# Patient Record
Sex: MALE | Race: AMERICAN INDIAN OR ALASKA NATIVE
[De-identification: names, ages, dates, MRNs, and addresses within clinical notes are randomized per-mention and may not be internally consistent; named-entity substitution may affect disease eponyms.]

---

## 2017-09-20 NOTE — CAT SCAN REPORT
FINAL REPORT



PROCEDURE:  CT CERVICAL SPINE WO CON



TECHNIQUE:  Computerized tomography of the cervical spine was

performed from the skull base to T1 without contrast material. 



HISTORY:  c-spine tenderness 



COMPARISON:  No prior studies are available for comparison.



FINDINGS:  

No fracture or subluxation is seen. The prevertebral soft tissues

appear normal. Posterior elements are intact. Posterior

osteophytic spurring is visualized at C3-4 level. This overlies a

diffuse disc bulge obscuring a portion the anterior epidural

space without definite cord compression or spinal stenosis. There

is minimal posterior osteophytic spurring also seen at C6-C7 to

the left of midline without cord compression or spinal stenosis. 



Calcifications are visualized in the left carotid artery

indicating atherosclerosis. 



No other abnormalities are identified. 



IMPRESSION:  

Mild degenerative disc disease as described. If further

evaluation is clinically indicated MRI could be obtained. 



Atherosclerosis carotid arteries.
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
## 2019-07-14 NOTE — CAT SCAN REPORT
CT head/brain wo con 



INDICATION / CLINICAL INFORMATION:

Fall.



TECHNIQUE:

All CT scans at this location are performed using CT dose reduction for ALARA by means of automated e
xposure control. 



COMPARISON:

None available.



FINDINGS:

No intracranial hemorrhage or abnormal extra-axial fluid collection.

The ventricular system and basilar cisterns are normal.

No fractures are identified.

The visualized paranasal sinuses and mastoid air cells are clear.



IMPRESSION:

1. No acute intracranial abnormality 



Signer Name: Romain Carnes MD 

Signed: 7/14/2019 1:14 AM

 Workstation Name: [id number]

## 2019-07-14 NOTE — XRAY REPORT
LUMBAR SPINE, AP AND LATERAL VIEWS

7/14/2019



INDICATION / CLINICAL INFORMATION:

Fall.



COMPARISON:

None available.



FINDINGS:

Segmental instrumentation L4-S1.

Bony alignment is well maintained.

Disc interspaces appear normal.

No fractures are identified.



Signer Name: Romain Carnes MD 

Signed: 7/14/2019 1:08 AM

 Workstation Name: [id number]

## 2019-07-14 NOTE — XRAY REPORT
LEFT HIP, 2 VIEWS

7/14/2019



INDICATION / CLINICAL INFORMATION:

pain after fall.



COMPARISON:

None available.



FINDINGS:

No fracture or dislocation.



Signer Name: Romain Carnes MD 

Signed: 7/14/2019 2:45 AM

 Workstation Name: [id number]
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
## 2020-02-16 ENCOUNTER — HOSPITAL ENCOUNTER (EMERGENCY)
Dept: HOSPITAL 5 - ED | Age: 45
LOS: 1 days | Discharge: HOME | End: 2020-02-17
Payer: MEDICAID

## 2020-02-16 DIAGNOSIS — F17.200: ICD-10-CM

## 2020-02-16 DIAGNOSIS — Z79.899: ICD-10-CM

## 2020-02-16 DIAGNOSIS — R10.13: Primary | ICD-10-CM

## 2020-02-16 LAB
ALBUMIN SERPL-MCNC: 4 G/DL (ref 3.9–5)
ALT SERPL-CCNC: 11 UNITS/L (ref 7–56)
APTT BLD: 26.4 SEC. (ref 24.2–36.6)
BASOPHILS # (AUTO): 0.2 K/MM3 (ref 0–0.1)
BASOPHILS NFR BLD AUTO: 2.3 % (ref 0–1.8)
BUN SERPL-MCNC: 9 MG/DL (ref 9–20)
BUN/CREAT SERPL: 9 %
CALCIUM SERPL-MCNC: 9.7 MG/DL (ref 8.4–10.2)
EOSINOPHIL # BLD AUTO: 0.7 K/MM3 (ref 0–0.4)
EOSINOPHIL NFR BLD AUTO: 6.6 % (ref 0–4.3)
HCT VFR BLD CALC: 52.3 % (ref 35.5–45.6)
HEMOLYSIS INDEX: 58
HGB BLD-MCNC: 18 GM/DL (ref 11.8–15.2)
INR PPP: 0.94 (ref 0.87–1.13)
LYMPHOCYTES # BLD AUTO: 1.5 K/MM3 (ref 1.2–5.4)
LYMPHOCYTES NFR BLD AUTO: 14.6 % (ref 13.4–35)
MCHC RBC AUTO-ENTMCNC: 34 % (ref 32–34)
MCV RBC AUTO: 91 FL (ref 84–94)
MONOCYTES # (AUTO): 0.4 K/MM3 (ref 0–0.8)
MONOCYTES % (AUTO): 4.3 % (ref 0–7.3)
PLATELET # BLD: 304 K/MM3 (ref 140–440)
RBC # BLD AUTO: 5.73 M/MM3 (ref 3.65–5.03)

## 2020-02-16 PROCEDURE — 84484 ASSAY OF TROPONIN QUANT: CPT

## 2020-02-16 PROCEDURE — 93005 ELECTROCARDIOGRAM TRACING: CPT

## 2020-02-16 PROCEDURE — 85610 PROTHROMBIN TIME: CPT

## 2020-02-16 PROCEDURE — 82550 ASSAY OF CK (CPK): CPT

## 2020-02-16 PROCEDURE — 80053 COMPREHEN METABOLIC PANEL: CPT

## 2020-02-16 PROCEDURE — 83690 ASSAY OF LIPASE: CPT

## 2020-02-16 PROCEDURE — 85730 THROMBOPLASTIN TIME PARTIAL: CPT

## 2020-02-16 PROCEDURE — 93010 ELECTROCARDIOGRAM REPORT: CPT

## 2020-02-16 PROCEDURE — 85025 COMPLETE CBC W/AUTO DIFF WBC: CPT

## 2020-02-16 PROCEDURE — 71046 X-RAY EXAM CHEST 2 VIEWS: CPT

## 2020-02-16 PROCEDURE — 36415 COLL VENOUS BLD VENIPUNCTURE: CPT

## 2020-02-16 PROCEDURE — 85379 FIBRIN DEGRADATION QUANT: CPT
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
## 2020-02-16 NOTE — XRAY REPORT
CHEST 2 VIEWS 



INDICATION / CLINICAL INFORMATION:

Chest Pain.



COMPARISON: 

09/07/16



FINDINGS:



SUPPORT DEVICES: None.



HEART / MEDIASTINUM: No significant abnormality. 



LUNGS / PLEURA: No significant pulmonary or pleural abnormality. No pneumothorax. 



ADDITIONAL FINDINGS: No significant additional findings.



IMPRESSION:

1. No acute findings. No significant change.



Signer Name: MICHAEL Keenan MD 

Signed: 2/16/2020 9:02 PM

 Workstation Name: [id number]

## 2020-02-17 VITALS — DIASTOLIC BLOOD PRESSURE: 77 MMHG | SYSTOLIC BLOOD PRESSURE: 144 MMHG

## 2021-07-29 ENCOUNTER — HOSPITAL ENCOUNTER (EMERGENCY)
Dept: HOSPITAL 5 - ED | Age: 46
LOS: 1 days | Discharge: HOME | End: 2021-07-30
Payer: MEDICAID
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
## 2021-07-29 DIAGNOSIS — F17.210: ICD-10-CM

## 2021-07-29 DIAGNOSIS — F10.20: ICD-10-CM
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
## 2021-07-29 DIAGNOSIS — M62.830: ICD-10-CM
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
## 2021-07-29 DIAGNOSIS — S29.012A: Primary | ICD-10-CM

## 2021-07-29 PROCEDURE — 96372 THER/PROPH/DIAG INJ SC/IM: CPT

## 2021-07-29 PROCEDURE — 99282 EMERGENCY DEPT VISIT SF MDM: CPT

## 2021-07-30 VITALS — SYSTOLIC BLOOD PRESSURE: 128 MMHG | DIASTOLIC BLOOD PRESSURE: 70 MMHG

## 2021-07-30 NOTE — EMERGENCY DEPARTMENT REPORT
ED Back Pain/Injury HPI





- General


Chief Complaint: Back Pain/Injury


Stated Complaint: UPPER BACK PAIN


Source: patient


Limitations: No Limitations





- History of Present Illness


Initial Comments: 





Patient is a 46-year-old white male with a history of chronic low back pain s/p 

kyphoplasty surgery of his lumbar spine and BPH who presents to the ED with 

complaint of acute onset persistent nontraumatic mid posterior thoracic pain for

the last 2 weeks, worse in the last 1 week.  Patient states that his job entails

heavy lifting and suspects that the pain may have resulted as a result of muscle

spasm with mid posterior thoracic area due to heavy lifting at work.  Patient 

states that he has been taking naproxen 500 mg twice a day as was prescribed by 

an urgent care clinic about 1 week ago but states that the pain has persisted 

and constant and is getting worse every day.  Patient denies fall, dizziness, 

syncope, chest pain, shortness of breath, neck pain, low back pain, fever, 

chills, traumatic injury, headache, numbness and tingling or weakness of upper 

and lower extremities bilaterally or abdominal pain.


MD Complaint: back pain (Mid posterior thoracic pain)


-: Sudden, week(s) (2)


Similar Symptoms Previously: Yes (Chronic low back pain)


Place: work


Radiation: none


Severity: severe


Severity scale (0 -10): 7


Quality: sharp, aching


Consistency: constant


Improves With: none


Worsens With: movement, sitting upright, walking


Context: while lifting


Associated Symptoms: denies other symptoms.  denies: confusion, weakness, chest 

pain, numbness, difficulty walking, cough, difficulty urinating, diaphoresis, 

incontinence, fever/chills, constipation, headaches, abdominal pain, loss of 

appetite, malaise, rash, seizure, shortness of breath, syncope, other


Treatments Prior to Arrival: NSAIDS (Naproxen 500 mg twice a day)





- Related Data


                                  Previous Rx's











 Medication  Instructions  Recorded  Last Taken  Type


 


Ibuprofen [Motrin] 800 mg PO Q8HR PRN #30 tablet 09/08/16 Unknown Rx


 


Ondansetron [Zofran Odt] 4 mg PO Q8HR PRN #20 tab.rapdis 09/08/16 Unknown Rx


 


Cyclobenzaprine HCl [Flexeril 5 MG 5 mg PO TID PRN #15 tab 09/20/17 Unknown Rx





TAB]    


 


HYDROcodone/APAP 5-325 [[place] 1 - 2 each PO Q6HR PRN #14 tablet 07/14/19 Unknown

 Rx





5/325]    


 


Ibuprofen [Motrin 800 MG tab] 800 mg PO Q8HR PRN #20 tablet 07/14/19 Unknown Rx


 


Famotidine [Pepcid] 20 mg PO BID #20 tablet 02/17/20 Unknown Rx


 


Ondansetron [Zofran Odt] 4 mg PO Q8HR PRN #20 tab.rapdis 02/17/20 Unknown Rx


 


traMADoL [Ultram] 50 mg PO Q6HR PRN #7 tablet 02/17/20 Unknown Rx


 


Baclofen 20 mg PO Q12H PRN #30 tablet 07/30/21 Unknown Rx


 


predniSONE [Deltasone] 40 mg PO QDAY #10 tab 07/30/21 Unknown Rx


 


traMADoL [Ultram 50 MG tab] 50 mg PO Q6HR PRN #12 tablet 07/30/21 Unknown Rx











                                    Allergies











Allergy/AdvReac Type Severity Reaction Status Date / Time


 


No Known Allergies Allergy   Verified 09/20/17 19:30














ED Review of Systems


ROS: 


Stated complaint: UPPER BACK PAIN


Other details as noted in HPI





Constitutional: denies: chills, fever


Eyes: denies: eye pain, eye discharge, vision change


ENT: denies: ear pain, throat pain


Respiratory: denies: cough, shortness of breath, wheezing


Cardiovascular: denies: chest pain, palpitations


Endocrine: no symptoms reported


Gastrointestinal: denies: abdominal pain, nausea, diarrhea


Genitourinary: denies: urgency, dysuria


Musculoskeletal: back pain (Mid posterior thoracic pain).  denies: joint 

swelling, arthralgia


Skin: denies: rash, lesions


Neurological: denies: headache, weakness, paresthesias


Psychiatric: denies: anxiety, depression


Hematological/Lymphatic: denies: easy bleeding, easy bruising





ED Past Medical Hx





- Past Medical History


Previous Medical History?: Yes


Additional medical history: BACK PAIN.  enlarge prostate





- Surgical History


Past Surgical History?: Yes


Additional Surgical History: L4,L5, S1 surgery with cindy placement in 2012





- Social History


Smoking Status: Current Every Day Smoker


Substance Use Type: Alcohol





- Medications


Home Medications: 


                                Home Medications











 Medication  Instructions  Recorded  Confirmed  Last Taken  Type


 


Ibuprofen [Motrin] 800 mg PO Q8HR PRN #30 tablet 09/08/16  Unknown Rx


 


Ondansetron [Zofran Odt] 4 mg PO Q8HR PRN #20 tab.rapdis 09/08/16  Unknown Rx


 


Cyclobenzaprine HCl [Flexeril 5 MG 5 mg PO TID PRN #15 tab 09/20/17  Unknown Rx





TAB]     


 


HYDROcodone/APAP 5-325 [[place] 1 - 2 each PO Q6HR PRN #14 tablet 07/14/19  

Unknown Rx





5/325]     


 


Ibuprofen [Motrin 800 MG tab] 800 mg PO Q8HR PRN #20 tablet 07/14/19  Unknown Rx


 


Famotidine [Pepcid] 20 mg PO BID #20 tablet 02/17/20  Unknown Rx


 


Ondansetron [Zofran Odt] 4 mg PO Q8HR PRN #20 tab.rapdis 02/17/20  Unknown Rx


 


traMADoL [Ultram] 50 mg PO Q6HR PRN #7 tablet 02/17/20  Unknown Rx


 


Baclofen 20 mg PO Q12H PRN #30 tablet 07/30/21  Unknown Rx


 


predniSONE [Deltasone] 40 mg PO QDAY #10 tab 07/30/21  Unknown Rx


 


traMADoL [Ultram 50 MG tab] 50 mg PO Q6HR PRN #12 tablet 07/30/21  Unknown Rx














ED Physical Exam





- General


Limitations: No Limitations


General appearance: alert, in no apparent distress





- Head


Head exam: Present: atraumatic, normocephalic, normal inspection





- Eye


Eye exam: Present: normal appearance, PERRL, EOMI


Pupils: Present: normal accommodation





- ENT


ENT exam: Present: normal exam, normal orophraynx, mucous membranes moist, TM's 

normal bilaterally, normal external ear exam





- Neck


Neck exam: Present: normal inspection, full ROM





- Respiratory


Respiratory exam: Present: normal lung sounds bilaterally.  Absent: respiratory 

distress, wheezes, rales, rhonchi, chest wall tenderness, accessory muscle use, 

decreased breath sounds, prolonged expiratory





- Cardiovascular


Cardiovascular Exam: Present: regular rate, normal rhythm, normal heart sounds. 

Absent: systolic murmur, diastolic murmur, rubs, gallop





- GI/Abdominal


GI/Abdominal exam: Present: soft, normal bowel sounds.  Absent: tenderness, 

guarding, rebound, hyperactive bowel sounds, hypoactive bowel sounds, 

organomegaly





- Extremities Exam


Extremities exam: Present: normal inspection, full ROM, normal capillary refill.

 Absent: tenderness





- Back Exam


Back exam: Present: normal inspection, full ROM, tenderness (Palpable mid 

posterior thoracic paraspinal musculoskeletal tenderness), muscle spasm, 

paraspinal tenderness.  Absent: CVA tenderness (R), CVA tenderness (L), 

vertebral tenderness





- Neurological Exam


Neurological exam: Present: alert, oriented X3, CN II-XII intact, normal gait, 

reflexes normal





- Psychiatric


Psychiatric exam: Present: normal affect, normal mood, anxious





- Skin


Skin exam: Present: warm, dry, intact, normal color.  Absent: rash





ED Course





                                   Vital Signs











  07/30/21





  00:52


 


Temperature 98.6 F


 


Pulse Rate 71


 


Respiratory 20





Rate 


 


Blood Pressure 128/70





[Right] 


 


O2 Sat by Pulse 100





Oximetry 














ED Medical Decision Making





- Medical Decision Making





This is a 46-year-old white male with a history of chronic low back pain s/p 

kyphoplasty surgery of his lumbar spine and BPH who presents to the ED with 

complaint of acute onset persistent nontraumatic mid posterior thoracic pain for

 the last 2 weeks, worse in the last 1 week.  Patient states that his job 

entails heavy lifting and suspects that the pain may have resulted as a result 

of muscle spasm with mid posterior thoracic area due to heavy lifting at work.  

Patient states that he has been taking naproxen 500 mg twice a day as was 

prescribed by an urgent care clinic about 1 week ago but states that the pain 

has persisted and constant and is getting worse every day.  In the ED, patient 

is alert and oriented x3 and is not in any distress.  Patient is hemodynamically

 stable.  Patient was treated for pain in the ED and on reevaluation, patient's 

pain is well controlled medications.  Patient was discharged home on medications

 and advised to follow-up with his primary care physician in 7 to 10 days for 

reevaluation or return to the ED immediately if symptoms get worse.





- Differential Diagnosis


Muscle spasm; muscle strain; osteoarthritis;


Critical care attestation.: 


If time is entered above; I have spent that time in minutes in the direct care 

of this critically ill patient, excluding procedure time.








ED Disposition


Clinical Impression: 


 Spasm of thoracic back muscle, Strain of muscle and tendon of back wall of 

thorax, initial encounter





Disposition: DC-01 TO HOME OR SELFCARE


Is pt being admited?: No


Does the pt Need Aspirin: No


Condition: Stable


Instructions:  Muscle Cramps and Spasms, Easy-to-Read, Muscle Strain, 

Easy-to-Read


Additional Instructions: 


Take medication with food, drink plenty of fluids and follow-up with your 

primary care physician in 7 to 10 days for reevaluation.  Return to the ED i

mmediately if symptoms get worse.


Prescriptions: 


Baclofen 20 mg PO Q12H PRN #30 tablet


 PRN Reason: Muscle Spasm


predniSONE [Deltasone] 40 mg PO QDAY #10 tab


traMADoL [Ultram 50 MG tab] 50 mg PO Q6HR PRN #12 tablet


 PRN Reason: Pain


Referrals: 


[facility] [Provider Group] - 3-5 Days


Time of Disposition: 02:16


Print Language: ENGLISH

## 2021-12-08 ENCOUNTER — HOSPITAL ENCOUNTER (EMERGENCY)
Dept: HOSPITAL 5 - ED | Age: 46
LOS: 1 days | Discharge: HOME | End: 2021-12-09
Payer: MEDICAID

## 2021-12-08 DIAGNOSIS — K29.20: Primary | ICD-10-CM

## 2021-12-08 DIAGNOSIS — Z79.899: ICD-10-CM

## 2021-12-08 DIAGNOSIS — Z72.89: ICD-10-CM

## 2021-12-08 DIAGNOSIS — F17.200: ICD-10-CM

## 2021-12-08 LAB
APTT BLD: 25.3 SEC. (ref 24.2–36.6)
BASOPHILS # (AUTO): 0.1 K/MM3 (ref 0–0.1)
BASOPHILS NFR BLD AUTO: 0.8 % (ref 0–1.8)
BENZODIAZEPINES SCREEN,URINE: (no result)
BUN SERPL-MCNC: 6 MG/DL (ref 9–20)
BUN/CREAT SERPL: 9 %
CALCIUM SERPL-MCNC: 8.9 MG/DL (ref 8.4–10.2)
EOSINOPHIL # BLD AUTO: 0.9 K/MM3 (ref 0–0.4)
EOSINOPHIL NFR BLD AUTO: 11 % (ref 0–4.3)
HCT VFR BLD CALC: 50.2 % (ref 35.5–45.6)
HEMOLYSIS INDEX: 4
HGB BLD-MCNC: 16.5 GM/DL (ref 11.8–15.2)
INR PPP: 0.81 (ref 0.87–1.13)
LYMPHOCYTES # BLD AUTO: 3.4 K/MM3 (ref 1.2–5.4)
LYMPHOCYTES NFR BLD AUTO: 40.8 % (ref 13.4–35)
MCHC RBC AUTO-ENTMCNC: 33 % (ref 32–34)
MCV RBC AUTO: 91 FL (ref 84–94)
METHADONE SCREEN,URINE: (no result)
MONOCYTES # (AUTO): 0.5 K/MM3 (ref 0–0.8)
MONOCYTES % (AUTO): 5.9 % (ref 0–7.3)
OPIATE SCREEN,URINE: (no result)
PLATELET # BLD: 316 K/MM3 (ref 140–440)
RBC # BLD AUTO: 5.49 M/MM3 (ref 3.65–5.03)

## 2021-12-08 PROCEDURE — 85379 FIBRIN DEGRADATION QUANT: CPT

## 2021-12-08 PROCEDURE — C9113 INJ PANTOPRAZOLE SODIUM, VIA: HCPCS

## 2021-12-08 PROCEDURE — G0480 DRUG TEST DEF 1-7 CLASSES: HCPCS

## 2021-12-08 PROCEDURE — 93005 ELECTROCARDIOGRAM TRACING: CPT

## 2021-12-08 PROCEDURE — 84484 ASSAY OF TROPONIN QUANT: CPT

## 2021-12-08 PROCEDURE — 36415 COLL VENOUS BLD VENIPUNCTURE: CPT

## 2021-12-08 PROCEDURE — 80320 DRUG SCREEN QUANTALCOHOLS: CPT

## 2021-12-08 PROCEDURE — 80048 BASIC METABOLIC PNL TOTAL CA: CPT

## 2021-12-08 PROCEDURE — 85025 COMPLETE CBC W/AUTO DIFF WBC: CPT

## 2021-12-08 PROCEDURE — 96361 HYDRATE IV INFUSION ADD-ON: CPT

## 2021-12-08 PROCEDURE — 96374 THER/PROPH/DIAG INJ IV PUSH: CPT

## 2021-12-08 PROCEDURE — 85730 THROMBOPLASTIN TIME PARTIAL: CPT

## 2021-12-08 PROCEDURE — 85610 PROTHROMBIN TIME: CPT

## 2021-12-08 PROCEDURE — 80307 DRUG TEST PRSMV CHEM ANLYZR: CPT

## 2021-12-08 PROCEDURE — 71045 X-RAY EXAM CHEST 1 VIEW: CPT

## 2021-12-08 PROCEDURE — 96375 TX/PRO/DX INJ NEW DRUG ADDON: CPT

## 2021-12-08 PROCEDURE — 99284 EMERGENCY DEPT VISIT MOD MDM: CPT

## 2021-12-08 PROCEDURE — 83690 ASSAY OF LIPASE: CPT

## 2021-12-08 NOTE — XRAY REPORT
XR chest 1V ap



INDICATION / CLINICAL INFORMATION: Chest Pain.



COMPARISON: 2/16/2020.



FINDINGS:



SUPPORT DEVICES: None.

HEART /PULMONARY VASCULATURE: No significant abnormality. 

LUNGS / PLEURA: No significant pulmonary or pleural abnormality. No pneumothorax. 



ADDITIONAL FINDINGS: No significant additional findings.



IMPRESSION:

1. No acute findings.

 



Signer Name: Enrique Mar MD 

Signed: 12/8/2021 11:30 PM

Workstation Name: [id number]

## 2021-12-08 NOTE — EMERGENCY DEPARTMENT REPORT
ED Chest Pain HPI





- General


Chief Complaint: Chest Pain


Stated Complaint: CHEST PAIN


Time Seen by Provider: 12/08/21 22:28


Source: patient


Mode of arrival: Ambulatory


Limitations: No Limitations





- History of Present Illness


Initial Comments: 





Patient is 46 years old male with no significant past medical history except for

chronic alcoholism.  Patient presented to the ER complaining of chest pain for 

the last 2 days worse today.  Patient stated that the pain started on the right 

side and kept moving from right to left to the epigastric area and to the back. 

Patient describes his pain as sharp in nature.  Patient denied any nausea or 

vomiting.  No fever or chills.


MD Complaint: chest pain


-: This morning


Onset: during rest


Pain Location: left chest, right chest, epigastric


Pain Radiation: back


Severity: moderate


Quality: sharp


Consistency: constant





- Related Data


                                  Previous Rx's











 Medication  Instructions  Recorded  Last Taken  Type


 


Ibuprofen [Motrin] 800 mg PO Q8HR PRN #30 tablet 09/08/16 Unknown Rx


 


Ondansetron [Zofran Odt] 4 mg PO Q8HR PRN #20 tab.rapdis 09/08/16 Unknown Rx


 


Cyclobenzaprine HCl [Flexeril 5 MG 5 mg PO TID PRN #15 tab 09/20/17 Unknown Rx





TAB]    


 


HYDROcodone/APAP 5-325 [[place] 1 - 2 each PO Q6HR PRN #14 tablet 07/14/19 Unknown

 Rx





5/325]    


 


Ibuprofen [Motrin 800 MG tab] 800 mg PO Q8HR PRN #20 tablet 07/14/19 Unknown Rx


 


Famotidine [Pepcid] 20 mg PO BID #20 tablet 02/17/20 Unknown Rx


 


Ondansetron [Zofran Odt] 4 mg PO Q8HR PRN #20 tab.rapdis 02/17/20 Unknown Rx


 


traMADoL [Ultram] 50 mg PO Q6HR PRN #7 tablet 02/17/20 Unknown Rx


 


Baclofen 20 mg PO Q12H PRN #30 tablet 07/30/21 Unknown Rx


 


predniSONE [Deltasone] 40 mg PO QDAY #10 tab 07/30/21 Unknown Rx


 


traMADoL [Ultram 50 MG tab] 50 mg PO Q6HR PRN #12 tablet 07/30/21 Unknown Rx











                                    Allergies











Allergy/AdvReac Type Severity Reaction Status Date / Time


 


No Known Allergies Allergy   Verified 09/20/17 19:30














Heart Score





- HEART Score


History: Slightly suspicious


EKG: Normal


Age: 45-65


Risk factors: No known risk factors


Troponin: < normal limit


HEART Score: 1





- EKG Read Time


Time EKG Completed: 22:03


EKG Read Time: 22:05





- Critical Actions


Critical Actions: 0-3 pts:0.9-1.7%risk of adverse cardiac event.Candidate for 

discharge





ED Review of Systems


ROS: 


Stated complaint: CHEST PAIN


Other details as noted in HPI





Comment: All other systems reviewed and negative


Constitutional: denies: chills, fever


Respiratory: denies: cough, shortness of breath, SOB with exertion


Cardiovascular: chest pain.  denies: palpitations, dyspnea on exertion


Gastrointestinal: abdominal pain.  denies: nausea, vomiting, diarrhea, constipa

tion, hematemesis, melena, hematochezia


Musculoskeletal: back pain


Neurological: denies: headache, weakness, numbness, paresthesias, confusion, 

abnormal gait





ED Past Medical Hx





- Past Medical History


Previous Medical History?: Yes


Additional medical history: BACK PAIN.  enlarge prostate





- Surgical History


Past Surgical History?: Yes


Additional Surgical History: L4,L5, S1 surgery with cindy placement in 2012





- Social History


Smoking Status: Current Every Day Smoker


Substance Use Type: Alcohol





- Medications


Home Medications: 


                                Home Medications











 Medication  Instructions  Recorded  Confirmed  Last Taken  Type


 


Ibuprofen [Motrin] 800 mg PO Q8HR PRN #30 tablet 09/08/16  Unknown Rx


 


Ondansetron [Zofran Odt] 4 mg PO Q8HR PRN #20 tab.rapdis 09/08/16  Unknown Rx


 


Cyclobenzaprine HCl [Flexeril 5 MG 5 mg PO TID PRN #15 tab 09/20/17  Unknown Rx





TAB]     


 


HYDROcodone/APAP 5-325 [[place] 1 - 2 each PO Q6HR PRN #14 tablet 07/14/19  

Unknown Rx





5/325]     


 


Ibuprofen [Motrin 800 MG tab] 800 mg PO Q8HR PRN #20 tablet 07/14/19  Unknown Rx


 


Famotidine [Pepcid] 20 mg PO BID #20 tablet 02/17/20  Unknown Rx


 


Ondansetron [Zofran Odt] 4 mg PO Q8HR PRN #20 tab.rapdis 02/17/20  Unknown Rx


 


traMADoL [Ultram] 50 mg PO Q6HR PRN #7 tablet 02/17/20  Unknown Rx


 


Baclofen 20 mg PO Q12H PRN #30 tablet 07/30/21  Unknown Rx


 


predniSONE [Deltasone] 40 mg PO QDAY #10 tab 07/30/21  Unknown Rx


 


traMADoL [Ultram 50 MG tab] 50 mg PO Q6HR PRN #12 tablet 07/30/21  Unknown Rx














ED Physical Exam





- General


Limitations: No Limitations


General appearance: alert, in no apparent distress





- Head


Head exam: Present: atraumatic, normocephalic, normal inspection





- Eye


Eye exam: Present: normal appearance





- ENT


ENT exam: Present: normal exam, normal orophraynx, mucous membranes moist





- Neck


Neck exam: Present: normal inspection, full ROM.  Absent: tenderness, 

meningismus





- Respiratory


Respiratory exam: Present: normal lung sounds bilaterally





- Cardiovascular


Cardiovascular Exam: Present: regular rate, normal rhythm, normal heart sounds





- GI/Abdominal


GI/Abdominal exam: Present: soft, normal bowel sounds.  Absent: distended, 

tenderness, guarding, rebound, rigid, organomegaly, mass, bruit, pulsatile mass,

hernia





- Extremities Exam


Extremities exam: Present: normal inspection, full ROM, normal capillary refill.

 Absent: tenderness, pedal edema, joint swelling, calf tenderness





- Back Exam


Back exam: Present: normal inspection, full ROM.  Absent: CVA tenderness (R), 

CVA tenderness (L)





- Neurological Exam


Neurological exam: Present: alert, oriented X3, CN II-XII intact, normal gait, 

reflexes normal.  Absent: motor sensory deficit





- Psychiatric


Psychiatric exam: Present: normal mood, anxious





- Skin


Skin exam: Present: warm, intact, normal color





ED Course


                                   Vital Signs











  12/08/21 12/08/21 12/08/21





  21:55 22:49 22:55


 


Temperature 97.3 F L  


 


Pulse Rate 91 H  79


 


Respiratory 18 18 24





Rate   


 


Blood Pressure 106/63  104/63





[Right]   


 


O2 Sat by Pulse 99  97





Oximetry   














SYDNEY score





- Sydney Score


Age > 65: (0) No


Aspirin use within the Past 7 Days: (0) No


3 or more CAD Risk Factors: (0) No


2 or more Angina events in past 24 hrs: (0) No


Known CAD with more than 50% Stenosis: (0) No


Elevated Cardiac Markers: (0) No


ST Deviation Greater than 0.5mm: (0) No


SYDNEY Score: 0





ED Medical Decision Making





- Lab Data


Result diagrams: 


                                 12/08/21 23:17





                                 12/08/21 23:17





- EKG Data


-: EKG Interpreted by Me


EKG shows normal: sinus rhythm


Rate: normal





- EKG Data


Interpretation: no acute changes





- Radiology Data


Radiology results: report reviewed





- Medical Decision Making





Patient is 46 years old male with no significant past medical history except for

 chronic alcoholism.  Patient presented to the ER complaining of chest pain for 

the last 2 days worse today.  Patient stated that the pain started on the right 

side and kept moving from right to left to the epigastric area and to the back. 

 Patient describes his pain as sharp in nature.  Patient denied any nausea or 

vomiting.  No fever or chills.





EKG showed no ST elevation or depression.  Chest x-ray is negative for acute 

finding.  Labs reviewed and is unremarkable including a negative troponin and a 

D-dimer of less than 135.  Patient symptoms most likely related to alcoholic 

gastritis however patient strongly advised to follow-up with his primary doctor 

for outpatient cardiac work-up and advised to return to the ER if he develop any

 new symptoms.





Critical care attestation.: 


If time is entered above; I have spent that time in minutes in the direct care 

of this critically ill patient, excluding procedure time.








ED Disposition


Clinical Impression: 


 Acute chest pain, Alcoholic gastritis





Disposition: 01 HOME / SELF CARE / HOMELESS


Is pt being admited?: No


Condition: Stable


Instructions:  Chest Pain (ED), Nonspecific Chest Pain, Adult, Gastritis, Adult


Referrals: 


PRIMARY CARE,MD [Primary Care Provider] - 3-5 Days

## 2021-12-09 VITALS — DIASTOLIC BLOOD PRESSURE: 62 MMHG | SYSTOLIC BLOOD PRESSURE: 101 MMHG

## 2021-12-09 NOTE — ELECTROCARDIOGRAPH REPORT
[facility]

                                       

Test Date:    2021               Test Time:    22:03:20

Pat Name:     NATE GELLER          Department:   

Patient ID:   [id number]          Room:          

Gender:                               Technician:   [id number]

:          1975               Requested By: JOSE DE JESUS ODONNELL

Order Number: [id number]              Reading MD:   Nuzhat Peraza

                                 Measurements

Intervals                              Axis          

Rate:         91                       P:            48

MT:           109                      QRS:          61

QRSD:         91                       T:            47

QT:           358                                    

QTc:          439                                    

                           Interpretive Statements

Sinus rhythm

No previous ECG available for comparison

Electronically Signed On 2021 10:36:43 EST by Nuzhat Peraza

## 2022-02-02 ENCOUNTER — HOSPITAL ENCOUNTER (EMERGENCY)
Dept: HOSPITAL 5 - ED | Age: 47
Discharge: HOME | End: 2022-02-02
Payer: MEDICAID

## 2022-02-02 VITALS — DIASTOLIC BLOOD PRESSURE: 80 MMHG | SYSTOLIC BLOOD PRESSURE: 126 MMHG

## 2022-02-02 DIAGNOSIS — N40.0: ICD-10-CM

## 2022-02-02 DIAGNOSIS — M54.9: Primary | ICD-10-CM

## 2022-02-02 DIAGNOSIS — Z98.890: ICD-10-CM

## 2022-02-02 DIAGNOSIS — F17.200: ICD-10-CM

## 2022-02-02 LAB
ALBUMIN SERPL-MCNC: 4.2 G/DL (ref 3.9–5)
ALT SERPL-CCNC: 20 UNITS/L (ref 7–56)
BASOPHILS # (AUTO): 0.1 K/MM3 (ref 0–0.1)
BASOPHILS NFR BLD AUTO: 0.5 % (ref 0–1.8)
BILIRUB UR QL STRIP: (no result)
BLOOD UR QL VISUAL: (no result)
BUN SERPL-MCNC: 8 MG/DL (ref 9–20)
BUN/CREAT SERPL: 9 %
CALCIUM SERPL-MCNC: 9.4 MG/DL (ref 8.4–10.2)
EOSINOPHIL # BLD AUTO: 0.1 K/MM3 (ref 0–0.4)
EOSINOPHIL NFR BLD AUTO: 1.2 % (ref 0–4.3)
HCT VFR BLD CALC: 47 % (ref 35.5–45.6)
HEMOLYSIS INDEX: 23
HGB BLD-MCNC: 15.5 GM/DL (ref 11.8–15.2)
LYMPHOCYTES # BLD AUTO: 1.9 K/MM3 (ref 1.2–5.4)
LYMPHOCYTES NFR BLD AUTO: 18.8 % (ref 13.4–35)
MCHC RBC AUTO-ENTMCNC: 33 % (ref 32–34)
MCV RBC AUTO: 91 FL (ref 84–94)
MONOCYTES # (AUTO): 0.9 K/MM3 (ref 0–0.8)
MONOCYTES % (AUTO): 9.2 % (ref 0–7.3)
PH UR STRIP: 7 [PH] (ref 5–7)
PLATELET # BLD: 276 K/MM3 (ref 140–440)
PROT UR STRIP-MCNC: (no result) MG/DL
RBC # BLD AUTO: 5.18 M/MM3 (ref 3.65–5.03)
RBC #/AREA URNS HPF: < 1 /HPF (ref 0–6)
UROBILINOGEN UR-MCNC: < 2 MG/DL (ref ?–2)
WBC #/AREA URNS HPF: < 1 /HPF (ref 0–6)

## 2022-02-02 PROCEDURE — 83690 ASSAY OF LIPASE: CPT

## 2022-02-02 PROCEDURE — 96372 THER/PROPH/DIAG INJ SC/IM: CPT

## 2022-02-02 PROCEDURE — 81001 URINALYSIS AUTO W/SCOPE: CPT

## 2022-02-02 PROCEDURE — 85025 COMPLETE CBC W/AUTO DIFF WBC: CPT

## 2022-02-02 PROCEDURE — 36415 COLL VENOUS BLD VENIPUNCTURE: CPT

## 2022-02-02 PROCEDURE — 99283 EMERGENCY DEPT VISIT LOW MDM: CPT

## 2022-02-02 PROCEDURE — 80053 COMPREHEN METABOLIC PANEL: CPT

## 2022-02-02 NOTE — EMERGENCY DEPARTMENT REPORT
ED Abdominal Pain HPI





- General


Stated Complaint: GROIN PAIN


PUI?: No


Time Seen by Provider: 02/02/22 12:01


Source: patient


Mode of arrival: Ambulatory


Limitations: No Limitations





- History of Present Illness


Initial Comments: 





45 YO COMES TO ER WITH LOW BACK PAIN RADIATING AROUND TO R HIP AREA


NO TRAUMA





SEEN AT URGENT CARE 2 DAYS AGO


GIVEN DOXY AND MOTRIN


BUT PAIN PERSISTS





NO N/V/D


NO CONSTIPATION


STOOL NORMAL COLOR


NO FEVER OR CHILLS


AMBULATORY TO ER


NO CP 


NO SOB


NO DYSURIA


NO DISCHARGE











ON EXAM ABD SNT


NO HERNIA ON EXAM








MD Complaint: other


-: Gradual, days(s), week(s), month(s)


Radiation: none


Severity: moderate


Severity scale (0 -10): 5


Quality: aching


Consistency: constant


Improves With: nothing


Worsens With: nothing


Associated Symptoms: denies other symptoms





- Related Data


                                  Previous Rx's











 Medication  Instructions  Recorded  Last Taken  Type


 


traMADoL [Ultram] 50 mg PO Q6HR PRN #10 tablet 02/02/22 Unknown Rx











                                    Allergies











Allergy/AdvReac Type Severity Reaction Status Date / Time


 


No Known Allergies Allergy   Verified 09/20/17 19:30














ED Review of Systems


ROS: 


Stated complaint: GROIN PAIN


Other details as noted in HPI





Comment: All other systems reviewed and negative





ED Past Medical Hx





- Past Medical History


Previous Medical History?: Yes


Additional medical history: BACK PAIN- CHRONIC.  K STONES.  enlarge prostate





- Surgical History


Past Surgical History?: Yes


Additional Surgical History: L4,L5, S1 surgery with cindy placement in 2012; 

TONSILECTOMY





- Family History


Family history: other (MOM ALIVE; DAD [facility])





- Social History


Smoking Status: Current Every Day Smoker


Substance Use Type: Alcohol





- Medications


Home Medications: 


                                Home Medications











 Medication  Instructions  Recorded  Confirmed  Last Taken  Type


 


traMADoL [Ultram] 50 mg PO Q6HR PRN #10 tablet 02/02/22  Unknown Rx














ED Physical Exam





- General


Limitations: No Limitations


General appearance: alert, in no apparent distress





- Head


Head exam: Present: atraumatic, normocephalic





- Eye


Eye exam: Present: normal appearance





- ENT


ENT exam: Present: mucous membranes moist





- Neck


Neck exam: Present: normal inspection





- Respiratory


Respiratory exam: Present: normal lung sounds bilaterally.  Absent: respiratory 

distress





- Cardiovascular


Cardiovascular Exam: Present: regular rate, normal rhythm.  Absent: systolic 

murmur, diastolic murmur, rubs, gallop





- GI/Abdominal


GI/Abdominal exam: Present: soft, normal bowel sounds





- Rectal


Rectal exam: Present: deferred





- Extremities Exam


Extremities exam: Present: normal inspection





- Back Exam


Back exam: Present: normal inspection





- Neurological Exam


Neurological exam: Present: alert, oriented X3





- Psychiatric


Psychiatric exam: Present: normal affect, normal mood





- Skin


Skin exam: Present: warm, dry, intact, normal color.  Absent: rash





ED Course


                                   Vital Signs











  02/02/22 02/02/22





  13:51 14:03


 


Temperature 98.3 F 98.0 F


 


Pulse Rate 68 73


 


Respiratory 16 16





Rate  


 


Blood Pressure 121/69 121/79





[Left]  


 


O2 Sat by Pulse 96 99





Oximetry  














ED Medical Decision Making





- Lab Data


Result diagrams: 


                                 02/02/22 14:59








- Medical Decision Making








                                   Lab Results











  02/02/22 Range/Units





  Unknown 


 


Urine Color  Yellow  (Yellow)  


 


Urine Turbidity  Clear  (Clear)  


 


Urine pH  7.0  (5.0-7.0)  


 


Ur Specific Gravity  1.008  (1.003-1.030)  


 


Urine Protein  <15 mg/dl  (Negative)  mg/dL


 


Urine Glucose (UA)  Neg  (Negative)  mg/dL


 


Urine Ketones  Tr  (Negative)  mg/dL


 


Urine Blood  Neg  (Negative)  


 


Urine Nitrite  Neg  (Negative)  


 


Urine Bilirubin  Neg  (Negative)  


 


Urine Urobilinogen  < 2.0  (<2.0)  mg/dL


 


Ur Leukocyte Esterase  Neg  (Negative)  


 


Urine WBC (Auto)  < 1.0  (0.0-6.0)  /HPF


 


Urine RBC (Auto)  < 1.0  (0.0-6.0)  /HPF











                                   Vital Signs











  02/02/22 02/02/22





  13:51 14:03


 


Temperature 98.3 F 98.0 F


 


Pulse Rate 68 73


 


Respiratory 16 16





Rate  


 


Blood Pressure 121/69 121/79





[Left]  


 


O2 Sat by Pulse 96 99





Oximetry  








UA NOTED


ON DOXY PER URGENT CARE





THEY SENT HIM HOME ON MOTRIN 


HE STATES THAT IS NOT HELPING PAIN





HAS A/C BACK PAIN-


THE PAIN IS LOW BACK OVER SACRUM. 


NO FLANK PAIN


NO CVA TENDERNESS


ABD SNT ON EXAM


NO HERNIA ON EXAM


TAKING PO


AMBULATORY IN ER


NEURO INTACT





PT EDUCATED ON FINDINGS


HE IS TO CONTINUE MEDS GIVEN AT URGENT CARE. HE WILL BE GIVEN ULTRAM HERE FOR 

PAIN AND HE WILL SEE PCP IN 48 HOURS. HE VERBALIZES UNDERSTANDING OF PLAN OF 

CARE. 








                                   Lab Results











  02/02/22 02/02/22 Range/Units





  14:59 Unknown 


 


WBC  10.3   (4.5-11.0)  K/mm3


 


RBC  5.18 H   (3.65-5.03)  M/mm3


 


Hgb  15.5 H   (11.8-15.2)  gm/dl


 


Hct  47.0 H   (35.5-45.6)  %


 


MCV  91   (84-94)  fl


 


MCH  30   (28-32)  pg


 


MCHC  33   (32-34)  %


 


RDW  17.6 H   (13.2-15.2)  %


 


Plt Count  276   (140-440)  K/mm3


 


Lymph % (Auto)  18.8   (13.4-35.0)  %


 


Mono % (Auto)  9.2 H   (0.0-7.3)  %


 


Eos % (Auto)  1.2   (0.0-4.3)  %


 


Baso % (Auto)  0.5   (0.0-1.8)  %


 


Lymph # (Auto)  1.9   (1.2-5.4)  K/mm3


 


Mono # (Auto)  0.9 H   (0.0-0.8)  K/mm3


 


Eos # (Auto)  0.1   (0.0-0.4)  K/mm3


 


Baso # (Auto)  0.1   (0.0-0.1)  K/mm3


 


Seg Neutrophils %  70.3 H   (40.0-70.0)  %


 


Seg Neutrophils #  7.2   (1.8-7.7)  K/mm3


 


Urine Color   Yellow  (Yellow)  


 


Urine Turbidity   Clear  (Clear)  


 


Urine pH   7.0  (5.0-7.0)  


 


Ur Specific Gravity   1.008  (1.003-1.030)  


 


Urine Protein   <15 mg/dl  (Negative)  mg/dL


 


Urine Glucose (UA)   Neg  (Negative)  mg/dL


 


Urine Ketones   Tr  (Negative)  mg/dL


 


Urine Blood   Neg  (Negative)  


 


Urine Nitrite   Neg  (Negative)  


 


Urine Bilirubin   Neg  (Negative)  


 


Urine Urobilinogen   < 2.0  (<2.0)  mg/dL


 


Ur Leukocyte Esterase   Neg  (Negative)  


 


Urine WBC (Auto)   < 1.0  (0.0-6.0)  /HPF


 


Urine RBC (Auto)   < 1.0  (0.0-6.0)  /HPF














- Differential Diagnosis


RO UTI/K STONE/CHRONIC BACK PAIN 


Critical care attestation.: 


If time is entered above; I have spent that time in minutes in the direct care 

of this critically ill patient, excluding procedure time.








ED Disposition


Clinical Impression: 


Back pain


Qualifiers:


 Back pain location: low back pain Chronicity: unspecified Back pain laterality:

 right Sciatica presence: unspecified whether sciatica present Qualified 

Code(s): M54.50 - Low back pain, unspecified





Disposition: 01 HOME / SELF CARE / HOMELESS


Is pt being admited?: No


Does the pt Need Aspirin: No


Condition: Stable


Instructions:  Acute Back Pain, Adult, Chronic Back Pain


Additional Instructions: 


CONTINUE MEDS GIVEN BY THE URGENT CARE





IN 48 HOURS FOLLOW UP WITH PCP FOR RECHECK


REFERRAL BELOW





DIET AND ACTIVITY AS TOLERATED





STAY WELL HYDRATED WITH WATER








Prescriptions: 


traMADoL [Ultram] 50 mg PO Q6HR PRN #10 tablet


 PRN Reason: Pain


Referrals: 


CHIARA SPRINGER MD [Staff Physician] - 3-5 Days


Time of Disposition: 15:19

## 2022-02-09 ENCOUNTER — OUT OF OFFICE VISIT (OUTPATIENT)
Dept: URBAN - METROPOLITAN AREA MEDICAL CENTER 41 | Facility: MEDICAL CENTER | Age: 47
End: 2022-02-09
Payer: MEDICAID

## 2022-02-09 DIAGNOSIS — Z79.1 ENCNTR LONG-TERM NSAID USE: ICD-10-CM

## 2022-02-09 DIAGNOSIS — R57.8 HEMORRHAGIC SHOCK: ICD-10-CM

## 2022-02-09 DIAGNOSIS — K22.10 BARRETT'S ESOPHAGEAL ULCERATION: ICD-10-CM

## 2022-02-09 DIAGNOSIS — K26.4 BLEEDING DUODENAL ULCER: ICD-10-CM

## 2022-02-09 DIAGNOSIS — K92.0 BLOODY EMESIS: ICD-10-CM

## 2022-02-09 PROCEDURE — 43255 EGD CONTROL BLEEDING ANY: CPT | Performed by: INTERNAL MEDICINE

## 2022-02-09 PROCEDURE — 99233 SBSQ HOSP IP/OBS HIGH 50: CPT | Performed by: INTERNAL MEDICINE

## 2022-02-09 PROCEDURE — 99223 1ST HOSP IP/OBS HIGH 75: CPT | Performed by: INTERNAL MEDICINE

## 2022-02-09 PROCEDURE — G8427 DOCREV CUR MEDS BY ELIG CLIN: HCPCS | Performed by: INTERNAL MEDICINE

## 2022-02-12 ENCOUNTER — OUT OF OFFICE VISIT (OUTPATIENT)
Dept: URBAN - METROPOLITAN AREA MEDICAL CENTER 41 | Facility: MEDICAL CENTER | Age: 47
End: 2022-02-12
Payer: MEDICAID

## 2022-02-12 DIAGNOSIS — K26.4 BLEEDING DUODENAL ULCER: ICD-10-CM

## 2022-02-12 PROCEDURE — 99232 SBSQ HOSP IP/OBS MODERATE 35: CPT | Performed by: INTERNAL MEDICINE

## 2022-04-11 ENCOUNTER — CLAIMS CREATED FROM THE CLAIM WINDOW (OUTPATIENT)
Dept: URBAN - METROPOLITAN AREA CLINIC 118 | Facility: CLINIC | Age: 47
End: 2022-04-11
Payer: MEDICAID

## 2022-04-11 ENCOUNTER — TELEPHONE ENCOUNTER (OUTPATIENT)
Dept: URBAN - METROPOLITAN AREA CLINIC 92 | Facility: CLINIC | Age: 47
End: 2022-04-11

## 2022-04-11 ENCOUNTER — LAB OUTSIDE AN ENCOUNTER (OUTPATIENT)
Dept: URBAN - METROPOLITAN AREA CLINIC 118 | Facility: CLINIC | Age: 47
End: 2022-04-11

## 2022-04-11 ENCOUNTER — DASHBOARD ENCOUNTERS (OUTPATIENT)
Age: 47
End: 2022-04-11

## 2022-04-11 DIAGNOSIS — K27.9 PUD (PEPTIC ULCER DISEASE): ICD-10-CM

## 2022-04-11 DIAGNOSIS — R10.13 EPIGASTRIC ABDOMINAL PAIN: ICD-10-CM

## 2022-04-11 DIAGNOSIS — Z12.11 SCREEN FOR COLON CANCER: ICD-10-CM

## 2022-04-11 PROCEDURE — 99204 OFFICE O/P NEW MOD 45 MIN: CPT | Performed by: INTERNAL MEDICINE

## 2022-04-11 NOTE — HPI-TODAY'S VISIT:
pt previously admitted [facility] for UGI bleed for pud s/p surgery now presents for follow up. pt notes was seen by surgery in clinic after dc and told to follow up GI for egd/colon. pt notes some gerd with epigastric pain. No nausea, vomiting, dysphagia or other UGI symptoms. No LGI symptoms. pt notes he is due for colon cancer screening. No other complaints.

## 2022-04-21 PROBLEM — 13200003: Status: ACTIVE | Noted: 2022-04-11

## 2022-05-06 ENCOUNTER — CLAIMS CREATED FROM THE CLAIM WINDOW (OUTPATIENT)
Dept: URBAN - METROPOLITAN AREA SURGERY CENTER 23 | Facility: SURGERY CENTER | Age: 47
End: 2022-05-06
Payer: MEDICAID

## 2022-05-06 ENCOUNTER — WEB ENCOUNTER (OUTPATIENT)
Dept: URBAN - METROPOLITAN AREA SURGERY CENTER 23 | Facility: SURGERY CENTER | Age: 47
End: 2022-05-06

## 2022-05-06 ENCOUNTER — CLAIMS CREATED FROM THE CLAIM WINDOW (OUTPATIENT)
Dept: URBAN - METROPOLITAN AREA CLINIC 4 | Facility: CLINIC | Age: 47
End: 2022-05-06
Payer: MEDICAID

## 2022-05-06 DIAGNOSIS — K29.60 ADENOPAPILLOMATOSIS GASTRICA: ICD-10-CM

## 2022-05-06 DIAGNOSIS — D12.4 BENIGN NEOPLASM OF DESCENDING COLON: ICD-10-CM

## 2022-05-06 DIAGNOSIS — K21.9 ACID REFLUX: ICD-10-CM

## 2022-05-06 DIAGNOSIS — K22.2 ACQUIRED ESOPHAGEAL RING: ICD-10-CM

## 2022-05-06 DIAGNOSIS — K21.9 GASTRO-ESOPHAGEAL REFLUX DISEASE WITHOUT ESOPHAGITIS: ICD-10-CM

## 2022-05-06 DIAGNOSIS — Z12.11 COLON CANCER SCREENING: ICD-10-CM

## 2022-05-06 DIAGNOSIS — K31.A0 GASTRIC INTESTINAL METAPLASIA, UNSPECIFIED: ICD-10-CM

## 2022-05-06 DIAGNOSIS — D12.4 ADENOMA OF DESCENDING COLON: ICD-10-CM

## 2022-05-06 PROCEDURE — 88305 TISSUE EXAM BY PATHOLOGIST: CPT | Performed by: PATHOLOGY

## 2022-05-06 PROCEDURE — G8907 PT DOC NO EVENTS ON DISCHARG: HCPCS | Performed by: INTERNAL MEDICINE

## 2022-05-06 PROCEDURE — 45385 COLONOSCOPY W/LESION REMOVAL: CPT | Performed by: INTERNAL MEDICINE

## 2022-05-06 PROCEDURE — 88342 IMHCHEM/IMCYTCHM 1ST ANTB: CPT | Performed by: PATHOLOGY

## 2022-05-06 PROCEDURE — 43239 EGD BIOPSY SINGLE/MULTIPLE: CPT | Performed by: INTERNAL MEDICINE

## 2022-05-06 PROCEDURE — 88312 SPECIAL STAINS GROUP 1: CPT | Performed by: PATHOLOGY
